# Patient Record
Sex: MALE | Race: BLACK OR AFRICAN AMERICAN | NOT HISPANIC OR LATINO | Employment: FULL TIME | URBAN - METROPOLITAN AREA
[De-identification: names, ages, dates, MRNs, and addresses within clinical notes are randomized per-mention and may not be internally consistent; named-entity substitution may affect disease eponyms.]

---

## 2025-03-17 ENCOUNTER — APPOINTMENT (OUTPATIENT)
Dept: RADIOLOGY | Facility: HOSPITAL | Age: 20
End: 2025-03-17

## 2025-03-17 ENCOUNTER — HOSPITAL ENCOUNTER (EMERGENCY)
Facility: HOSPITAL | Age: 20
Discharge: HOME | End: 2025-03-17

## 2025-03-17 VITALS
RESPIRATION RATE: 18 BRPM | DIASTOLIC BLOOD PRESSURE: 71 MMHG | HEART RATE: 81 BPM | OXYGEN SATURATION: 97 % | SYSTOLIC BLOOD PRESSURE: 116 MMHG | TEMPERATURE: 98.2 F
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
## 2025-03-17 PROCEDURE — 70450 CT HEAD/BRAIN W/O DYE: CPT

## 2025-03-17 PROCEDURE — 72125 CT NECK SPINE W/O DYE: CPT | Performed by: RADIOLOGY

## 2025-03-17 PROCEDURE — 70450 CT HEAD/BRAIN W/O DYE: CPT | Performed by: RADIOLOGY

## 2025-03-17 PROCEDURE — 2500000001 HC RX 250 WO HCPCS SELF ADMINISTERED DRUGS (ALT 637 FOR MEDICARE OP)

## 2025-03-17 PROCEDURE — 99284 EMERGENCY DEPT VISIT MOD MDM: CPT

## 2025-03-17 PROCEDURE — 99284 EMERGENCY DEPT VISIT MOD MDM: CPT | Mod: 25

## 2025-03-17 PROCEDURE — 72125 CT NECK SPINE W/O DYE: CPT

## 2025-03-17 RX ORDER — ACETAMINOPHEN 325 MG/1
650 TABLET ORAL ONCE
Status: COMPLETED | OUTPATIENT
Start: 2025-03-17 | End: 2025-03-17

## 2025-03-17 RX ADMIN — ACETAMINOPHEN 650 MG: 325 TABLET ORAL at 19:14

## 2025-03-17 ASSESSMENT — COLUMBIA-SUICIDE SEVERITY RATING SCALE - C-SSRS
2. HAVE YOU ACTUALLY HAD ANY THOUGHTS OF KILLING YOURSELF?: NO
1. IN THE PAST MONTH, HAVE YOU WISHED YOU WERE DEAD OR WISHED YOU COULD GO TO SLEEP AND NOT WAKE UP?: NO
6. HAVE YOU EVER DONE ANYTHING, STARTED TO DO ANYTHING, OR PREPARED TO DO ANYTHING TO END YOUR LIFE?: NO

## 2025-03-17 NOTE — ED PROVIDER NOTES
HPI   Chief Complaint   Patient presents with    Head Injury       19-year-old male no pertinent past medical history presents emergency department for chief complaint mechanical fall earlier today.  Patient reports that he was trying to get up on a bunk bed and was unable to fully grasp the top bunk and states that he slipped back and fell backwards.  Patient reports that he hit his head.  Patient is reporting diffuse headache since this incident as well as photophobia.  Denies LOC, anticoagulation, paresthesias, vomiting, incontinence, other areas of injury.  Patient reports that he was ambulatory after the incident occurred.              Patient History   No past medical history on file.  No past surgical history on file.  No family history on file.  Social History     Tobacco Use    Smoking status: Not on file    Smokeless tobacco: Not on file   Substance Use Topics    Alcohol use: Not on file    Drug use: Not on file       Physical Exam   ED Triage Vitals [03/17/25 1849]   Temperature Heart Rate Respirations BP   36.8 °C (98.2 °F) 81 18 116/71      Pulse Ox Temp src Heart Rate Source Patient Position   97 % -- -- --      BP Location FiO2 (%)     -- --       Physical Exam  Vitals and nursing note reviewed.   Constitutional:       General: He is not in acute distress.     Appearance: Normal appearance. He is normal weight. He is not ill-appearing or toxic-appearing.   HENT:      Head: Normocephalic and atraumatic.      Mouth/Throat:      Mouth: Mucous membranes are moist.   Eyes:      Extraocular Movements: Extraocular movements intact.      Pupils: Pupils are equal, round, and reactive to light.   Cardiovascular:      Rate and Rhythm: Normal rate and regular rhythm.      Heart sounds: Normal heart sounds. No murmur heard.     No friction rub. No gallop.   Pulmonary:      Effort: Pulmonary effort is normal. No respiratory distress.      Breath sounds: Normal breath sounds. No stridor. No wheezing, rhonchi or  rales.   Musculoskeletal:      Comments: No C-spine T-spine or L-spine tenderness in the midline regions on palpation.  No palpable step-offs or deformity.   Neurological:      General: No focal deficit present.      Mental Status: He is alert and oriented to person, place, and time. Mental status is at baseline.      Cranial Nerves: No cranial nerve deficit.      Sensory: No sensory deficit.      Motor: No weakness.      Coordination: Coordination normal.      Gait: Gait normal.      Comments: 5 out of 5 strength to upper extremities bilaterally.  5 out of 5 strength to lower extremities bilaterally.  Pupils equal round and reactive.  Answers questions appropriately.           ED Course & MDM   Diagnoses as of 03/17/25 2145   Fall, initial encounter                 No data recorded     [place] Coma Scale Score: 15 (03/17/25 1849 : Zohreh Gallegos RN)                           Medical Decision Making  19-year-old male presents emergency department chief complaint mechanical fall that occurred shortly prior to arrival.  Patient reportedly was trying to get up on the top of a bunk bed and fell backwards as he was not able to get a good grasp.  Patient states that he believes he hit the back of his head.  Denies any LOC.  Patient has no focal neurologic deficits on exam but does complain of diffuse headache as well as photophobia.  Patient answering questions and mentating appropriately at bedside. Patient has no areas of laceration or blood across the head.  Given patient's mechanism of injury will obtain CT of the head and C-spine to rule out acute intracranial abnormality or osseous injury.  Patient will be medicated here in the emergency department and reevaluated.    9:45 PM CTs of the head and C-spine negative for acute traumatic injury.  With this in mind did advise patient of these findings and he did show understanding of this.  Patient neurovascularly intact at this time.  No focal neurologic deficits and is  overall well-appearing in the emergency department with stable vital signs.  Patient does not appear in acute distress and is ambulatory.  Advised patient of return precautions.  Patient did have opportunity ask questions and had them answered and no further complaints at this time and was amenable to plan moving forward for discharge.        Procedure  Procedures     Sang Carlson PA-C  03/18/25 0238

## 2025-03-17 NOTE — ED TRIAGE NOTES
Pt reports falling off top bunk on bunks beds. States he fell head first. Endorses head pain and sensitivity to light. Denies LOC, thinners.